# Patient Record
(demographics unavailable — no encounter records)

---

## 2024-10-16 NOTE — HISTORY OF PRESENT ILLNESS
[Rest] : rest [6] : 6 [5] : 5 [Radiating] : radiating [Standing] : standing [de-identified] : 2/16/24: bilateral foot pain and deformity right>left.  +maternal history.  Has tried shoe modifications without relief and there is difficulty finding shoes.   3/25/24: right lapidus bunionectomy  3/27/24. f/u R foot. NWB with crutches. She states she fell down twice over the past 2 days and jammed her right foot.  4/3/24: f/u R foot. NWB with knee scooter. Doing ok.  4/10/24  f/u R foot  took shower and steri strip fell off  5/15/24: f/u R foot. Doing well. Comfortable in slippers. Wearing toe spacer.  6/26/24: f/u R foot.  She was dx with [place] Spotted Fever, following with ID.  She had to stop PT with pain.  Her pain, swelling and symptoms have recurred.  She is febrile. In process of work up for Babiosis.  She is taking Tetracycline and gabapentin. Reports diffuse body joint pain. T Max 102, averaging   08/29/2024 ALDEN STRATTON a 59 year old female here for evaluation of her left foot. Patient states she has a bunion on the left foot worsening since 6/2024. Pain localized along the big toe. Denies trauma/previous injury. WB in sandals. She has tried shoe modifications without relief.   9/6/24: Left Lapidus Bunionectomy.  9/18/24: f/u L foot. NWB in splint. Doing ok.  10/16/24 f/u f/u L foot wb in cam boot, toe spacer [] : no [FreeTextEntry1] : left foot  [FreeTextEntry7] : pain in arch [FreeTextEntry9] : wears a boot as needed [de-identified] : 9-6-24 [de-identified] : 9-6-24

## 2024-10-16 NOTE — DISCUSSION/SUMMARY
[Surgical risks reviewed] : Surgical risks reviewed [de-identified] : Toe spacer HEP, does not want to do PT at this time

## 2024-10-16 NOTE — PHYSICAL EXAM
[Left] : left foot and ankle [1+] : dorsalis pedis pulse: 1+ [Mild] : mild swelling of medial foot [] : no calf tenderness [de-identified] : using knee scooter

## 2024-12-19 NOTE — PHYSICAL EXAM
[Mild] : mild swelling of medial foot [1+] : dorsalis pedis pulse: 1+ [Left] : left knee [NL (0)] : extension 0 degrees [NL (40)] : plantar flexion 40 degrees [] : no achilles tendon insertion tenderness [FreeTextEntry3] : dorsal lateral foot abrasion, no signs of infection [TWNoteComboBox7] : dorsiflexion 15 degrees

## 2024-12-19 NOTE — HISTORY OF PRESENT ILLNESS
[6] : 6 [5] : 5 [Radiating] : radiating [Rest] : rest [Standing] : standing [de-identified] : 2/16/24: bilateral foot pain and deformity right>left.  +maternal history.  Has tried shoe modifications without relief and there is difficulty finding shoes.   3/25/24: right lapidus bunionectomy  3/27/24. f/u R foot. NWB with crutches. She states she fell down twice over the past 2 days and jammed her right foot.  4/3/24: f/u R foot. NWB with knee scooter. Doing ok.  4/10/24  f/u R foot  took shower and steri strip fell off  5/15/24: f/u R foot. Doing well. Comfortable in slippers. Wearing toe spacer.  6/26/24: f/u R foot.  She was dx with [place] Spotted Fever, following with ID.  She had to stop PT with pain.  Her pain, swelling and symptoms have recurred.  She is febrile. In process of work up for Babiosis.  She is taking Tetracycline and gabapentin. Reports diffuse body joint pain. T Max 102, averaging   08/29/2024 ALDEN STRATTON a 59 year old female here for evaluation of her left foot. Patient states she has a bunion on the left foot worsening since 6/2024. Pain localized along the big toe. Denies trauma/previous injury. WB in sandals. She has tried shoe modifications without relief.   9/6/24: Left Lapidus Bunionectomy.  9/18/24: f/u L foot. NWB in splint. Doing ok.  10/16/24 f/u f/u L foot wb in cam boot, toe spacer 12/19/2024: NI L foot/knee. Patient states she slipped and fell on a box of lights 12/18. States she having pain along the lateral foot and her knee.  L foot hyperextension injury on a step and she banged the knee.  +Ice/Advil [] : no [FreeTextEntry1] : left foot  [FreeTextEntry7] : pain in arch [FreeTextEntry9] : wears a boot as needed [de-identified] : 9-6-24 [de-identified] : 9-6-24

## 2024-12-19 NOTE — DISCUSSION/SUMMARY
[de-identified] : Imaging reviewed. Dressing applied with bactracin to the left foot abrasion L knee pre-patellar bursa aspirated; 15cc bloody aspirate icing/compression recommended f/u 3-4 weeks for re evaluation

## 2024-12-19 NOTE — IMAGING
[AP] : anteroposterior [Lateral] : lateral [[place]ankinson] : skyline [Left] : left foot [There are no fractures, subluxations or dislocations. No significant abnormalities are seen] : There are no fractures, subluxations or dislocations. No significant abnormalities are seen [No loss of surgical correlation. Bony alignment acceptable. Hardware in appropriate position] : No loss of surgical correlation. Bony alignment acceptable. Hardware in appropriate position [FreeTextEntry9] : soft tissue swelling

## 2024-12-19 NOTE — PROCEDURE
[Large Joint Injection] : Large joint injection [Left] : of the left [Pre Patella Bursae] : pre patella bursae [Pain] : pain [Inflammation] : inflammation [Alcohol] : alcohol [Ethyl Chloride sprayed topically] : ethyl chloride sprayed topically [Sterile technique used] : sterile technique used [___ cc    1%] : Lidocaine ~Vcc of 1%  [Effusion] : effusion [] : Patient tolerated procedure well [Call if redness, pain or fever occur] : call if redness, pain or fever occur [Apply ice for 15min out of every hour for the next 12-24 hours as tolerated] : apply ice for 15 minutes out of every hour for the next 12-24 hours as tolerated [Previous OTC use and PT nontherapeutic] : patient has tried OTC's including aspirin, Ibuprofen, Aleve, etc or prescription NSAIDS, and/or exercises at home and/or physical therapy without satisfactory response [Patient had decreased mobility in the joint] : patient had decreased mobility in the joint [Risks, benefits, alternatives discussed / Verbal consent obtained] : the risks benefits, and alternatives have been discussed, and verbal consent was obtained [de-identified] : 15cc [de-identified] : blood

## 2025-04-21 NOTE — DISCUSSION/SUMMARY
[Medication Risks Reviewed] : Medication risks reviewed [Surgical risks reviewed] : Surgical risks reviewed [2 Weeks] : in 2 weeks [de-identified] : Prescription for Mobic 15mg once a day was provided on today's office vist. Recommendation for an MRI of the cervical spine to evaluate the bilateral knee hyper-reflexive  seen on today's exam. Patient is to return to review the MRI of the thoracic spine and discuss the results and formulate a plan of care.  I, Dr. Luis Armando Felder, personally performed the evaluation and management (E/M) services for this new patient.  That E/M includes conducting the initial examination, assessing all conditions, and establishing a plan of care.  Today, my ACP, Stacey Snyder, was here to observe my evaluation and management services for this patient to be followed going forward.

## 2025-04-21 NOTE — PHYSICAL EXAM
[ALL] : Biceps, brachioradialis, triceps, patellar, ankle and plantar 2+ and symmetric bilaterally [Normal RLE] : Right Lower Extremity: No scars, rashes, lesions, ulcers, skin intact [Normal LLE] : Left Lower Extremity: No scars, rashes, lesions, ulcers, skin intact [Normal] : No costovertebral angle tenderness and no spinal tenderness [UE] : Sensory: Intact in bilateral upper extremities [de-identified] : Good Active ROM of bilateral shoulders [de-identified] : negative tension signs of the upper extremities [de-identified] : hyper reflexive bilateral patella [de-identified] : wall push ups with symmetrical shoulder blades, no winging [de-identified] : 2 views of the cervical spine  AP view with good alignment of the cervical spine Lateral view with multilevel degenerative changes and a retrolisthesis at C4-5. No instability is noted. No obvious fracture, no bony tumors.  2 views of the thoracic spine AP good alignment. Lateral view of increased multilevel degenerative changes and a Gibus deformity. No obvious fracture, no bony tumors.
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
## 2025-06-02 NOTE — HISTORY OF PRESENT ILLNESS
[10] : a current pain level of 10/10 [de-identified] : Patient is here today to review the recent results of an MRI for thoracic spine. The patient continues to have symptoms pertaining to upper back. The patient reports no significant changes in their symptoms since their last visit.

## 2025-06-02 NOTE — ADDENDUM
[FreeTextEntry1] :  I, Rosalindatammy Maddend, acted solely as a scribe for Dr. Luis Armando Felder on this date 06/02/2025 .  All medical records entries made by the Scribe were at my Dr. Luis Armando Felder direction and personally dictated by me on 06/02/2025. I have reviewed the chart and agree that the record accurately reflects my personal performance of the history, physical exam, assessment and plan. I have also personally directed, reviewed, and agreed with the chart.

## 2025-06-02 NOTE — PHYSICAL EXAM
[UE] : Sensory: Intact in bilateral upper extremities [ALL] : Biceps, brachioradialis, triceps, patellar, ankle and plantar 2+ and symmetric bilaterally [Normal RLE] : Right Lower Extremity: No scars, rashes, lesions, ulcers, skin intact [Normal LLE] : Left Lower Extremity: No scars, rashes, lesions, ulcers, skin intact [Normal] : No costovertebral angle tenderness and no spinal tenderness [de-identified] : Good Active ROM of bilateral shoulders [de-identified] : negative tension signs of the upper extremities [de-identified] : hyper reflexive bilateral patella [de-identified] : wall push ups with symmetrical shoulder blades, no winging [de-identified] : MRI Evaluation of the Lumbar spine taken on 05/24/2025 shows __  INTERPRETATION:  MRI of the thoracic spine  TECHNIQUE: Multiplanar multisequence MRI of the thoracic spine without contrast  INDICATION: Hyperreflexia of bilateral lower extremity.  COMPARISON: None available.  FINDINGS:  Localizer: There is mild reverse curvature of the cervical spine with degenerative disc disease at the C4-C5 and C5-C6 level with suspected mild to moderate spinal canal stenosis.  Alignment: There is maintenance of expected thoracic kyphosis.  Marrow: There is no fracture. Modic type I changes at the C7-T1 and T8-T9 levels. Multilevel Schmorl's node formation.  Cord: There is no abnormal cord signal.  Individual levels: C7-T1: Broad-based disc bulge without spinal canal or neural foraminal stenosis. T1-T7: No spinal canal or neural foraminal stenosis. T7-T8: Small right paracentral disc protrusion with indentation of the ventral thecal sac without spinal canal or neuroforaminal stenosis. T8-T9: Small left paracentral disc protrusion with indentation of the ventral thecal sac without spinal canal or neural foraminal stenosis. T9-T10: Mild disc bulging without spinal canal or neuroforaminal stenosis. T10-T11: Broad-based left paracentral disc protrusion with indentation of the ventral thecal sac without spinal canal stenosis. There is mild left neural foraminal stenosis. T11-T12: Small left paracentral disc protrusion without spinal canal or neuroforaminal stenosis. T12-L1: No spinal canal or neuroforaminal stenosis.  Included intrathoracic and intra-abdominal tissues: Normal.  Paraspinal muscle bellies and soft tissues: Normal.  IMPRESSION:  MRI of the thoracic spine demonstrate mild multilevel spondylosis as described.  Limited evaluation of the cervical spine on localizer demonstrates spondylosis at the mid cervical spine with suspected mild to moderate spinal canal stenosis. Consider correlation with dedicated MRI of the cervical spine based on patient symptomatology.  --- End of Report ---

## 2025-06-02 NOTE — DISCUSSION/SUMMARY
[PRN] : PRN [Medication Risks Reviewed] : Medication risks reviewed [de-identified] :  I have discussed the underlying pathophysiology of the patient's condition and MRI findings. We discussed all forms a conservative treatment will bring about short-term relief.  I recommend that the patient consult a pain management specialist for an epidural injection in order to provide her with longer term relief. We discussed the patient's issues and talked about the benefits and risks of the injections. The patient was provided with a recommended pain management specialist address and phone number. Patient should follow up with me as needed.

## 2025-06-11 NOTE — HISTORY OF PRESENT ILLNESS
[FreeTextEntry1] : THIS PLEASANT PATIENT IS 60 year OLD  female  WHO PRESENT TODAY IN OFFICE WITH TSPINE RADATING FRONT CHEST AND SCAPULA      DATE OF INJURY/ONSET  ON GOING ISSUE FOR YEARS      PAIN LEVEL: 7/10     MECHANISM OF INJURY: UNKOWN INJURAY       QUALITY OF SYMPTOMS:  RADATING, STABBING, BURNING      IMPROVES WITH:  TRIED, ICE, LIDOCAINE TPI, HEAT,      WORSE WITH:  STANDING, LIFITNG, WALKING      PRIOR TREATMENT: AS OF 01.01.2025  PATIENT IS PRESENTING WITH ACUTE/SUB-ACUTE RADICULAR PAIN WITH IMPAIRMENT IN ADLs AND FUNCTIONALITY. THE PATIENT HAS NOT RESPONDED TO CONSERVATIVE CARE INCLUDING NSAID THERAPY AND/OR PHYSICAL THERAPY 2-3X A WEEK FOR 6 WEEK.        PRIOR IMAGING:  MRI [facility] 05.24.2025       SCHOOL/SPORT/POSITION/OCCUPATION:       ADDITIONAL INFORMATION   Not indicated for this patient

## 2025-06-11 NOTE — ASSESSMENT
[FreeTextEntry1] : Subjective findings This 60-year-old female presents to us with pain in the middle of the back with a radiating component through the paraspinous muscles bilaterally around to the front underneath the breast.  Patient says that this been an ongoing issue for years.  Patient is under the care of multiple providers and has undergone medical management and physical therapy minimal control of this pain.  Patient says the pain has been increasing recently.  She has recently been evaluated by a surgeon who suggested that she try an epidural steroid injection for the treatment of this pain.  He did not feel that she was a surgical candidate at this time.  The CV/Pulm/GI/Heme/Renal/Hepatic//Psych/Endo systems are reviewed. A full ROS was performed and reviewed with the patient today, see intake form.  Average pain score for the month is 8 out of ten. The patient's current medications are documented to the best of their ability. The patient has failed conservative therapies to include medical management, and physical activity to treat the pain. The patient has decreased function secondary to the pain. Objective findings Imaging studies are consistent with the patient's pain complaints.  Patient has significant muscle spasm throughout the paraspinous muscles bilaterally in the thoracic area.  Palpation of the greater area reproduces some of the patient's muscle spasmodic component of the pain but not the complete pain complaint.  Patient does have reproduction of the pain with torso of the spine. Assessment Thoracic radiculopathy Plan Spoke to patient about epidural steroid injections. Explained risks, benefits and alternatives including but not limited to the risk of infection, bleeding, headache, syncopal episode, failure to resolve issues, allergic reaction, symptom recurrence, allergic reaction, nerve injury, and increased pain. The patient understands the risks. All questions were answered. The patient is willing to proceed. The importance of increasing exercise after the injection is also stressed. The use of the injection as a jump start so that the patient can do more exercise, but that the exercise is the long-term answer for the patient is reviewed. The patient states understanding.  This note was generated by using Dragon medical dictation software.  A reasonable effort has been made for proofreading its contents, but typos may still remain.  If there are any questions or points of clarification needed, please notify my office.

## 2025-07-22 NOTE — HISTORY OF PRESENT ILLNESS
[Upper back] : upper back [Mid-back] : mid-back [Ice] : ice [Retired] : Work status: retired [] : yes [FreeTextEntry6] : [employer] [de-identified] : PT HAD RELIEF OF THE AREA THAT HAD THE TESI LAST MONTH - THE PAIN HAS SINCE MOVED. [de-identified] : 06.19.2025 [TWNoteComboBox1] : 70%

## 2025-07-22 NOTE — ASSESSMENT
[FreeTextEntry1] : Interim history The patient returns with pain that has been treated adequately in the past with epidural steroid injections.  The patient's complaints are consistent with radiculopathy. Patient's ADL's increase with prior LEX.   The last injection gave greater than 60% reduction of the pain but now there is a return of symptoms. The patient is requesting a subsequent epidural steroid injection to alleviate pain and improve functional ability and quality of life.  Patient is a candidate. Objective findings Since the last visit, there have been no new imaging studies. The patient has no new symptoms except the return of the their pain complaints. Motor and sensory function is unchanged. Plan Spoke to patient about epidural steroid injections. Explained risks, benefits and alternatives including but not limited to the risk of infection, bleeding, headache, syncopal episode, failure to resolve issues, allergic reaction, symptom recurrence, allergic reaction, nerve injury, and increased pain. The patient understands the risks. All questions were answered. The patient is willing to proceed.  This note was generated by using Dragon medical dictation software.  A reasonable effort has been made for proofreading its contents, but typos may still remain.  If there are any questions or points of clarification needed, please notify my office.